# Patient Record
Sex: FEMALE | Race: WHITE | Employment: UNEMPLOYED | URBAN - METROPOLITAN AREA
[De-identification: names, ages, dates, MRNs, and addresses within clinical notes are randomized per-mention and may not be internally consistent; named-entity substitution may affect disease eponyms.]

---

## 2023-01-26 ENCOUNTER — APPOINTMENT (OUTPATIENT)
Dept: ULTRASOUND IMAGING | Age: 28
End: 2023-01-26
Attending: EMERGENCY MEDICINE
Payer: COMMERCIAL

## 2023-01-26 ENCOUNTER — HOSPITAL ENCOUNTER (INPATIENT)
Age: 28
LOS: 1 days | Discharge: HOME | End: 2023-01-27
Attending: STUDENT IN AN ORGANIZED HEALTH CARE EDUCATION/TRAINING PROGRAM | Admitting: INTERNAL MEDICINE
Payer: COMMERCIAL

## 2023-01-26 ENCOUNTER — APPOINTMENT (OUTPATIENT)
Dept: CT IMAGING | Age: 28
End: 2023-01-26
Attending: EMERGENCY MEDICINE
Payer: COMMERCIAL

## 2023-01-26 DIAGNOSIS — R10.9 ACUTE RIGHT FLANK PAIN: ICD-10-CM

## 2023-01-26 DIAGNOSIS — R11.2 INTRACTABLE VOMITING WITH NAUSEA: ICD-10-CM

## 2023-01-26 DIAGNOSIS — N20.0 KIDNEY STONE: ICD-10-CM

## 2023-01-26 DIAGNOSIS — R10.31 RIGHT LOWER QUADRANT ABDOMINAL PAIN: Primary | ICD-10-CM

## 2023-01-26 PROBLEM — N13.8 URINARY TRACT OBSTRUCTION BY KIDNEY STONE: Status: ACTIVE | Noted: 2023-01-26

## 2023-01-26 LAB
ALBUMIN SERPL-MCNC: 3.8 G/DL (ref 3.5–5)
ALBUMIN/GLOB SERPL: 1 (ref 1.1–2.2)
ALP SERPL-CCNC: 80 U/L (ref 45–117)
ALT SERPL-CCNC: 28 U/L (ref 12–78)
ANION GAP SERPL CALC-SCNC: 4 MMOL/L (ref 5–15)
APPEARANCE UR: CLEAR
AST SERPL-CCNC: 14 U/L (ref 15–37)
BACTERIA URNS QL MICRO: NEGATIVE /HPF
BASOPHILS # BLD: 0 K/UL (ref 0–0.1)
BASOPHILS NFR BLD: 0 % (ref 0–1)
BILIRUB SERPL-MCNC: 0.5 MG/DL (ref 0.2–1)
BILIRUB UR QL: NEGATIVE
BUN SERPL-MCNC: 18 MG/DL (ref 6–20)
BUN/CREAT SERPL: 21 (ref 12–20)
CALCIUM SERPL-MCNC: 8.4 MG/DL (ref 8.5–10.1)
CHLORIDE SERPL-SCNC: 115 MMOL/L (ref 97–108)
CO2 SERPL-SCNC: 18 MMOL/L (ref 21–32)
COLOR UR: ABNORMAL
COMMENT, HOLDF: NORMAL
CREAT SERPL-MCNC: 0.85 MG/DL (ref 0.55–1.02)
DIFFERENTIAL METHOD BLD: NORMAL
EOSINOPHIL # BLD: 0.2 K/UL (ref 0–0.4)
EOSINOPHIL NFR BLD: 2 % (ref 0–7)
EPITH CASTS URNS QL MICRO: ABNORMAL /LPF
ERYTHROCYTE [DISTWIDTH] IN BLOOD BY AUTOMATED COUNT: 12.1 % (ref 11.5–14.5)
GLOBULIN SER CALC-MCNC: 4 G/DL (ref 2–4)
GLUCOSE SERPL-MCNC: 103 MG/DL (ref 65–100)
GLUCOSE UR STRIP.AUTO-MCNC: NEGATIVE MG/DL
HCG UR QL: NEGATIVE
HCT VFR BLD AUTO: 40 % (ref 35–47)
HGB BLD-MCNC: 13.4 G/DL (ref 11.5–16)
HGB UR QL STRIP: ABNORMAL
IMM GRANULOCYTES # BLD AUTO: 0 K/UL (ref 0–0.04)
IMM GRANULOCYTES NFR BLD AUTO: 0 % (ref 0–0.5)
KETONES UR QL STRIP.AUTO: NEGATIVE MG/DL
LEUKOCYTE ESTERASE UR QL STRIP.AUTO: ABNORMAL
LIPASE SERPL-CCNC: 132 U/L (ref 73–393)
LYMPHOCYTES # BLD: 2.5 K/UL (ref 0.8–3.5)
LYMPHOCYTES NFR BLD: 41 % (ref 12–49)
MCH RBC QN AUTO: 31 PG (ref 26–34)
MCHC RBC AUTO-ENTMCNC: 33.5 G/DL (ref 30–36.5)
MCV RBC AUTO: 92.6 FL (ref 80–99)
MONOCYTES # BLD: 0.6 K/UL (ref 0–1)
MONOCYTES NFR BLD: 9 % (ref 5–13)
NEUTS SEG # BLD: 2.9 K/UL (ref 1.8–8)
NEUTS SEG NFR BLD: 48 % (ref 32–75)
NITRITE UR QL STRIP.AUTO: NEGATIVE
NRBC # BLD: 0 K/UL (ref 0–0.01)
NRBC BLD-RTO: 0 PER 100 WBC
PH UR STRIP: 6.5 (ref 5–8)
PLATELET # BLD AUTO: 375 K/UL (ref 150–400)
PMV BLD AUTO: 9.2 FL (ref 8.9–12.9)
POTASSIUM SERPL-SCNC: 3.6 MMOL/L (ref 3.5–5.1)
PROT SERPL-MCNC: 7.8 G/DL (ref 6.4–8.2)
PROT UR STRIP-MCNC: NEGATIVE MG/DL
RBC # BLD AUTO: 4.32 M/UL (ref 3.8–5.2)
RBC #/AREA URNS HPF: ABNORMAL /HPF (ref 0–5)
SAMPLES BEING HELD,HOLD: NORMAL
SODIUM SERPL-SCNC: 137 MMOL/L (ref 136–145)
SP GR UR REFRACTOMETRY: 1.01 (ref 1–1.03)
TROPONIN-HIGH SENSITIVITY: 4 NG/L (ref 0–51)
UR CULT HOLD, URHOLD: NORMAL
UROBILINOGEN UR QL STRIP.AUTO: 0.2 EU/DL (ref 0.2–1)
WBC # BLD AUTO: 6.2 K/UL (ref 3.6–11)
WBC URNS QL MICRO: ABNORMAL /HPF (ref 0–4)

## 2023-01-26 PROCEDURE — 96375 TX/PRO/DX INJ NEW DRUG ADDON: CPT

## 2023-01-26 PROCEDURE — 96372 THER/PROPH/DIAG INJ SC/IM: CPT

## 2023-01-26 PROCEDURE — 74176 CT ABD & PELVIS W/O CONTRAST: CPT

## 2023-01-26 PROCEDURE — 65270000032 HC RM SEMIPRIVATE

## 2023-01-26 PROCEDURE — 74011250637 HC RX REV CODE- 250/637: Performed by: INTERNAL MEDICINE

## 2023-01-26 PROCEDURE — G0378 HOSPITAL OBSERVATION PER HR: HCPCS

## 2023-01-26 PROCEDURE — 96376 TX/PRO/DX INJ SAME DRUG ADON: CPT

## 2023-01-26 PROCEDURE — 76856 US EXAM PELVIC COMPLETE: CPT

## 2023-01-26 PROCEDURE — 85025 COMPLETE CBC W/AUTO DIFF WBC: CPT

## 2023-01-26 PROCEDURE — 74011000250 HC RX REV CODE- 250: Performed by: EMERGENCY MEDICINE

## 2023-01-26 PROCEDURE — 76830 TRANSVAGINAL US NON-OB: CPT

## 2023-01-26 PROCEDURE — 74011250636 HC RX REV CODE- 250/636: Performed by: INTERNAL MEDICINE

## 2023-01-26 PROCEDURE — 80053 COMPREHEN METABOLIC PANEL: CPT

## 2023-01-26 PROCEDURE — 81001 URINALYSIS AUTO W/SCOPE: CPT

## 2023-01-26 PROCEDURE — 74011250636 HC RX REV CODE- 250/636: Performed by: NURSE PRACTITIONER

## 2023-01-26 PROCEDURE — 36415 COLL VENOUS BLD VENIPUNCTURE: CPT

## 2023-01-26 PROCEDURE — 99285 EMERGENCY DEPT VISIT HI MDM: CPT

## 2023-01-26 PROCEDURE — 81025 URINE PREGNANCY TEST: CPT

## 2023-01-26 PROCEDURE — 84484 ASSAY OF TROPONIN QUANT: CPT

## 2023-01-26 PROCEDURE — 74011250636 HC RX REV CODE- 250/636: Performed by: EMERGENCY MEDICINE

## 2023-01-26 PROCEDURE — 96361 HYDRATE IV INFUSION ADD-ON: CPT

## 2023-01-26 PROCEDURE — 74011250636 HC RX REV CODE- 250/636: Performed by: STUDENT IN AN ORGANIZED HEALTH CARE EDUCATION/TRAINING PROGRAM

## 2023-01-26 PROCEDURE — 99221 1ST HOSP IP/OBS SF/LOW 40: CPT | Performed by: SURGERY

## 2023-01-26 PROCEDURE — 96374 THER/PROPH/DIAG INJ IV PUSH: CPT

## 2023-01-26 PROCEDURE — 83690 ASSAY OF LIPASE: CPT

## 2023-01-26 RX ORDER — ONDANSETRON 2 MG/ML
4 INJECTION INTRAMUSCULAR; INTRAVENOUS
Status: COMPLETED | OUTPATIENT
Start: 2023-01-26 | End: 2023-01-26

## 2023-01-26 RX ORDER — TAMSULOSIN HYDROCHLORIDE 0.4 MG/1
0.4 CAPSULE ORAL DAILY
Status: DISCONTINUED | OUTPATIENT
Start: 2023-01-26 | End: 2023-01-27 | Stop reason: HOSPADM

## 2023-01-26 RX ORDER — ACETAZOLAMIDE 250 MG/1
250 TABLET ORAL 2 TIMES DAILY
Status: DISCONTINUED | OUTPATIENT
Start: 2023-01-26 | End: 2023-01-27 | Stop reason: HOSPADM

## 2023-01-26 RX ORDER — ACETAMINOPHEN 650 MG/1
650 SUPPOSITORY RECTAL
Status: DISCONTINUED | OUTPATIENT
Start: 2023-01-26 | End: 2023-01-27 | Stop reason: HOSPADM

## 2023-01-26 RX ORDER — MORPHINE SULFATE 2 MG/ML
2 INJECTION, SOLUTION INTRAMUSCULAR; INTRAVENOUS
Status: DISCONTINUED | OUTPATIENT
Start: 2023-01-26 | End: 2023-01-27 | Stop reason: HOSPADM

## 2023-01-26 RX ORDER — KETOROLAC TROMETHAMINE 30 MG/ML
15 INJECTION, SOLUTION INTRAMUSCULAR; INTRAVENOUS EVERY 6 HOURS
Status: DISCONTINUED | OUTPATIENT
Start: 2023-01-26 | End: 2023-01-27 | Stop reason: HOSPADM

## 2023-01-26 RX ORDER — ACETAZOLAMIDE 250 MG/1
250 TABLET ORAL 2 TIMES DAILY
COMMUNITY
Start: 2022-12-30

## 2023-01-26 RX ORDER — PROCHLORPERAZINE EDISYLATE 5 MG/ML
10 INJECTION INTRAMUSCULAR; INTRAVENOUS
Status: COMPLETED | OUTPATIENT
Start: 2023-01-26 | End: 2023-01-26

## 2023-01-26 RX ORDER — HYDROMORPHONE HYDROCHLORIDE 1 MG/ML
1 INJECTION, SOLUTION INTRAMUSCULAR; INTRAVENOUS; SUBCUTANEOUS
Status: DISCONTINUED | OUTPATIENT
Start: 2023-01-26 | End: 2023-01-27 | Stop reason: HOSPADM

## 2023-01-26 RX ORDER — DIPHENHYDRAMINE HYDROCHLORIDE 50 MG/ML
25 INJECTION, SOLUTION INTRAMUSCULAR; INTRAVENOUS
Status: COMPLETED | OUTPATIENT
Start: 2023-01-26 | End: 2023-01-26

## 2023-01-26 RX ORDER — ONDANSETRON 2 MG/ML
4 INJECTION INTRAMUSCULAR; INTRAVENOUS
Status: DISCONTINUED | OUTPATIENT
Start: 2023-01-26 | End: 2023-01-27 | Stop reason: HOSPADM

## 2023-01-26 RX ORDER — AMITRIPTYLINE HYDROCHLORIDE 10 MG/1
10 TABLET, FILM COATED ORAL
COMMUNITY
Start: 2022-12-30

## 2023-01-26 RX ORDER — ACETAMINOPHEN 325 MG/1
650 TABLET ORAL
Status: DISCONTINUED | OUTPATIENT
Start: 2023-01-26 | End: 2023-01-27 | Stop reason: HOSPADM

## 2023-01-26 RX ORDER — MORPHINE SULFATE 2 MG/ML
2 INJECTION, SOLUTION INTRAMUSCULAR; INTRAVENOUS
Status: COMPLETED | OUTPATIENT
Start: 2023-01-26 | End: 2023-01-26

## 2023-01-26 RX ORDER — KETOROLAC TROMETHAMINE 30 MG/ML
15 INJECTION, SOLUTION INTRAMUSCULAR; INTRAVENOUS
Status: COMPLETED | OUTPATIENT
Start: 2023-01-26 | End: 2023-01-26

## 2023-01-26 RX ORDER — ONDANSETRON 4 MG/1
4 TABLET, ORALLY DISINTEGRATING ORAL
Status: DISCONTINUED | OUTPATIENT
Start: 2023-01-26 | End: 2023-01-27 | Stop reason: HOSPADM

## 2023-01-26 RX ORDER — SODIUM CHLORIDE 0.9 % (FLUSH) 0.9 %
5-40 SYRINGE (ML) INJECTION EVERY 8 HOURS
Status: DISCONTINUED | OUTPATIENT
Start: 2023-01-26 | End: 2023-01-27 | Stop reason: HOSPADM

## 2023-01-26 RX ORDER — DICYCLOMINE HYDROCHLORIDE 10 MG/ML
20 INJECTION INTRAMUSCULAR
Status: COMPLETED | OUTPATIENT
Start: 2023-01-26 | End: 2023-01-26

## 2023-01-26 RX ORDER — SODIUM CHLORIDE 0.9 % (FLUSH) 0.9 %
5-40 SYRINGE (ML) INJECTION AS NEEDED
Status: DISCONTINUED | OUTPATIENT
Start: 2023-01-26 | End: 2023-01-27 | Stop reason: HOSPADM

## 2023-01-26 RX ORDER — POLYETHYLENE GLYCOL 3350 17 G/17G
17 POWDER, FOR SOLUTION ORAL DAILY PRN
Status: DISCONTINUED | OUTPATIENT
Start: 2023-01-26 | End: 2023-01-27 | Stop reason: HOSPADM

## 2023-01-26 RX ORDER — SODIUM CHLORIDE, SODIUM LACTATE, POTASSIUM CHLORIDE, CALCIUM CHLORIDE 600; 310; 30; 20 MG/100ML; MG/100ML; MG/100ML; MG/100ML
125 INJECTION, SOLUTION INTRAVENOUS CONTINUOUS
Status: DISCONTINUED | OUTPATIENT
Start: 2023-01-26 | End: 2023-01-27 | Stop reason: HOSPADM

## 2023-01-26 RX ORDER — AMITRIPTYLINE HYDROCHLORIDE 10 MG/1
10 TABLET, FILM COATED ORAL
Status: DISCONTINUED | OUTPATIENT
Start: 2023-01-26 | End: 2023-01-27 | Stop reason: HOSPADM

## 2023-01-26 RX ADMIN — ONDANSETRON HYDROCHLORIDE 4 MG: 2 SOLUTION INTRAMUSCULAR; INTRAVENOUS at 11:51

## 2023-01-26 RX ADMIN — MORPHINE SULFATE 2 MG: 2 INJECTION, SOLUTION INTRAMUSCULAR; INTRAVENOUS at 13:27

## 2023-01-26 RX ADMIN — MORPHINE SULFATE 2 MG: 2 INJECTION, SOLUTION INTRAMUSCULAR; INTRAVENOUS at 15:56

## 2023-01-26 RX ADMIN — FAMOTIDINE 20 MG: 10 INJECTION, SOLUTION INTRAVENOUS at 11:51

## 2023-01-26 RX ADMIN — SODIUM CHLORIDE 1000 ML: 9 INJECTION, SOLUTION INTRAVENOUS at 13:33

## 2023-01-26 RX ADMIN — AMITRIPTYLINE HYDROCHLORIDE 10 MG: 10 TABLET, FILM COATED ORAL at 22:40

## 2023-01-26 RX ADMIN — KETOROLAC TROMETHAMINE 15 MG: 30 INJECTION, SOLUTION INTRAMUSCULAR; INTRAVENOUS at 18:45

## 2023-01-26 RX ADMIN — TAMSULOSIN HYDROCHLORIDE 0.4 MG: 0.4 CAPSULE ORAL at 18:43

## 2023-01-26 RX ADMIN — PROCHLORPERAZINE EDISYLATE 10 MG: 5 INJECTION INTRAMUSCULAR; INTRAVENOUS at 14:16

## 2023-01-26 RX ADMIN — ONDANSETRON HYDROCHLORIDE 4 MG: 2 SOLUTION INTRAMUSCULAR; INTRAVENOUS at 16:00

## 2023-01-26 RX ADMIN — SODIUM CHLORIDE 1000 ML: 9 INJECTION, SOLUTION INTRAVENOUS at 11:50

## 2023-01-26 RX ADMIN — KETOROLAC TROMETHAMINE 15 MG: 30 INJECTION, SOLUTION INTRAMUSCULAR; INTRAVENOUS at 11:51

## 2023-01-26 RX ADMIN — DICYCLOMINE HYDROCHLORIDE 20 MG: 10 INJECTION, SOLUTION INTRAMUSCULAR at 13:27

## 2023-01-26 RX ADMIN — HYDROMORPHONE HYDROCHLORIDE 1 MG: 1 INJECTION, SOLUTION INTRAMUSCULAR; INTRAVENOUS; SUBCUTANEOUS at 18:49

## 2023-01-26 RX ADMIN — DIPHENHYDRAMINE HYDROCHLORIDE 25 MG: 50 INJECTION, SOLUTION INTRAMUSCULAR; INTRAVENOUS at 14:16

## 2023-01-26 RX ADMIN — ACETAZOLAMIDE 250 MG: 250 TABLET ORAL at 18:43

## 2023-01-26 RX ADMIN — SODIUM CHLORIDE 1000 ML: 9 INJECTION, SOLUTION INTRAVENOUS at 15:09

## 2023-01-26 NOTE — ED PROVIDER NOTES
Abdominal Pain   Pertinent negatives include no fever, no diarrhea, no nausea, no vomiting, no dysuria, no headaches, no arthralgias and no chest pain. Patient is a 31-year-old female with past medical history significant for asthma, blood in her urine, kidney stones, pelvic pain, pseudotumor and frequent UTI who presents to the ED with right mid to Right lower abdominal pain that awoke her from a sleep this morning. She states that she felt fine yesterday. She has not had any food or medications today prior to arrival.  She drove her self here. Denies fever, cold symptoms, headache, neck pain, visual changes, focal weakness or rash. Denies any difficulty breathing, difficulty swallowing, SOB or chest pain. Denies any nausea, vomiting or diarrhea. Old charts reviewed.     Past Medical History:   Diagnosis Date    Asthma     Blood in urine     Irregular menstrual cycle     Kidney stone     Nausea & vomiting     Pelvic pain     Pseudotumor     UTI (urinary tract infection)        Past Surgical History:   Procedure Laterality Date    HX ADENOIDECTOMY      HX WISDOM TEETH EXTRACTION           Family History:   Problem Relation Age of Onset    Asthma Mother     Hypertension Mother     Thyroid Disease Mother     Heart Disease Sister     Diabetes Paternal Uncle     Hypertension Paternal Uncle     Diabetes Maternal Grandmother     Hypertension Maternal Grandmother        Social History     Socioeconomic History    Marital status: SINGLE     Spouse name: Not on file    Number of children: Not on file    Years of education: Not on file    Highest education level: Not on file   Occupational History    Not on file   Tobacco Use    Smoking status: Never    Smokeless tobacco: Never   Substance and Sexual Activity    Alcohol use: No    Drug use: Never    Sexual activity: Not on file   Other Topics Concern    Not on file   Social History Narrative    Not on file     Social Determinants of Health     Financial Resource Strain: Not on file   Food Insecurity: Not on file   Transportation Needs: Not on file   Physical Activity: Not on file   Stress: Not on file   Social Connections: Not on file   Intimate Partner Violence: Not on file   Housing Stability: Not on file         ALLERGIES: Amoxicillin-pot clavulanate    Review of Systems   Constitutional:  Negative for activity change, appetite change, fever and unexpected weight change. HENT:  Negative for congestion, rhinorrhea and sore throat. Eyes:  Negative for visual disturbance. Respiratory:  Negative for cough and shortness of breath. Cardiovascular:  Negative for chest pain, palpitations and leg swelling. Gastrointestinal:  Positive for abdominal pain. Negative for diarrhea, nausea and vomiting. Genitourinary:  Negative for dysuria and flank pain. Musculoskeletal:  Negative for arthralgias, gait problem and joint swelling. Skin:  Negative for rash. Neurological:  Negative for dizziness, light-headedness and headaches. All other systems reviewed and are negative. Vitals:    01/26/23 0956   BP: (!) 140/77   Pulse: (!) 105   Resp: 20   Temp: 97.6 °F (36.4 °C)   SpO2: 97%            Physical Exam  Vitals and nursing note reviewed. Constitutional:       General: She is not in acute distress. Appearance: She is well-developed. She is not ill-appearing, toxic-appearing or diaphoretic. 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 Cardiovascular:      Rate and Rhythm: Regular rhythm. Tachycardia present. Pulmonary:      Effort: Pulmonary effort is normal.      Breath sounds: Normal breath sounds. Abdominal:      General: Bowel sounds are normal. There is no distension. There are no signs of injury. Palpations: Abdomen is soft. Tenderness: There is abdominal tenderness in the right upper quadrant, right lower quadrant and periumbilical area. There is no guarding or rebound.  Negative signs include Hudson's sign and McBurney's sign. Hernia: No hernia is present. Skin:     General: Skin is warm and dry. Coloration: Skin is not pale. Findings: No erythema or rash. Neurological:      General: No focal deficit present. Mental Status: She is alert and oriented to person, place, and time. Medical Decision Making  Amount and/or Complexity of Data Reviewed  Labs: ordered. Radiology: ordered. ECG/medicine tests: ordered. Risk  Prescription drug management. Decision regarding hospitalization. Procedures  CT ABD PELV WO CONT    Result Date: 1/26/2023  1. Obstructing right ureterovesical junction calculus (2 x 3 mm). Mild to moderate hydronephrosis. 2. Multiple, additional, small, bilateral, nonobstructing, renal calculi. US TRANSVAGINAL    Result Date: 1/26/2023  No acute process. US PELV NON OBS    Result Date: 1/26/2023  No acute process. Labs Reviewed   URINALYSIS W/MICROSCOPIC - Abnormal; Notable for the following components:       Result Value    Blood MODERATE (*)     Leukocyte Esterase TRACE (*)     All other components within normal limits   METABOLIC PANEL, COMPREHENSIVE - Abnormal; Notable for the following components:    Chloride 115 (*)     CO2 18 (*)     Anion gap 4 (*)     Glucose 103 (*)     BUN/Creatinine ratio 21 (*)     Calcium 8.4 (*)     AST (SGOT) 14 (*)     A-G Ratio 1.0 (*)     All other components within normal limits   URINE CULTURE HOLD SAMPLE   CBC WITH AUTOMATED DIFF   LIPASE   SAMPLES BEING HELD   HCG URINE, QL. - POC     Dr. Maryjo Lozano (surgery) came to the ED to evaluate the pt. [address] [facility] urology came to evaluate the pt Carrie Christianson NP/Dr. Hopson Screen) ; they want the hospitalist to admit for stent placement tomorrow. Perfect Serve Consult for Admission  4:21 PM    ED Room Number: R37/R37  Patient Name and age:  Belén Rasmussen 24 y.o.  female  Working Diagnosis:   1. Right lower quadrant abdominal pain    2.  Intractable vomiting with nausea    3. Acute right flank pain    4. Kidney stone        COVID-19 Suspicion:  no  Sepsis present:  no  Reassessment needed: no  Code Status:  Full Code  Readmission: no  Isolation Requirements:  no  Recommended Level of Care:  med/surg  Department:[facility] Adult ED - 21   Other:       6:43 PM  Patient's results and plan of care have been reviewed with the pt and her family member present. Patient and/or family have verbally conveyed their understanding and agreement of the patient's signs, symptoms, diagnosis, treatment and prognosis and additionally agree to be admitted. Francine Teixeira NP  Discussed plan of care with Dr. Jakub Amor. Francine Teixeira NP

## 2023-01-26 NOTE — ED TRIAGE NOTES
Pt c/o right sided abd pain that started today, +nausea, some diarrhea, some dysuria, denies pregnancy, denies constipation , pt tearful in triage

## 2023-01-26 NOTE — H&P
[address] [facility] Adult  Hospitalist Group  History and Physical    Date of Service:  1/26/2023  Primary Care Provider: Darya Craig MD  Source of information: The patient and Chart review    Chief Complaint: Abdominal Pain      History of Presenting Illness:   Dee Calderón is a 24 y.o. female with h/o asthma, kidney stones, pseudotumor cerebri and endometriosis who presented d/t sudden RLQ abd pain that awoke her from sleep this morning. A/w N/V today. Initially pain was 10/10, now 3/10 after IV Morphine here. No h/o obstructing stones or need for intervention. Pseudotumor is well controlled on home meds Diamox and Elavil. She has a h/o asthma when she was younger; no recent issues. The patient denies any headache, blurry vision, sore throat, trouble swallowing, trouble with speech, chest pain, SOB, cough, fever, chills, constipation, recent travels, sick contacts, focal or generalized neurological symptoms, falls, injuries, rashes, contact with COVID-19 diagnosed patients, hematemesis, melena, hemoptysis, hematuria, rashes, denies starting any new medications and denies any other concerns or problems besides as mentioned above. REVIEW OF SYSTEMS:  A comprehensive review of systems was negative except for that written in the History of Present Illness. Past Medical History:   Diagnosis Date    Asthma     Blood in urine     Irregular menstrual cycle     Kidney stone     Nausea & vomiting     Pelvic pain     Pseudotumor     UTI (urinary tract infection)       Past Surgical History:   Procedure Laterality Date    HX ADENOIDECTOMY      HX WISDOM TEETH EXTRACTION       Prior to Admission medications    Medication Sig Start Date End Date Taking? Authorizing Provider   acetaZOLAMIDE (DIAMOX) 250 mg tablet Take 250 mg by mouth two (2) times a day. 12/30/22   Provider, Historical   amitriptyline (ELAVIL) 10 mg tablet Take 10 mg by mouth nightly.  12/30/22   Provider, Historical     Allergies Allergen Reactions    Amoxicillin-Pot Clavulanate Other (comments)      Family History   Problem Relation Age of Onset    Asthma Mother     Hypertension Mother     Thyroid Disease Mother     Heart Disease Sister     Diabetes Paternal Uncle     Hypertension Paternal Uncle     Diabetes Maternal Grandmother     Hypertension Maternal Grandmother       Social History:  reports that she has never smoked. She has never used smokeless tobacco. She reports that she does not drink alcohol and does not use drugs. Family and social history were personally reviewed, all pertinent and relevant details are outlined as above. Objective:   Visit Vitals  BP (!) 140/77   Pulse (!) 105   Temp 97.6 °F (36.4 °C)   Resp 20   SpO2 97%      O2 Device: None (Room air)    PHYSICAL EXAM:   General:          Alert, cooperative, no distress  Head:    Normocephalic, atraumatic  Eyes:   Conjunctivae clear, no scleral icterus, EOMs intact  ENT:   Mucosa normal. No drainage  Neck:               Supple, symmetrical, trachea midline  Back:               Symmetric, ROM normal. R CVA tenderness  Lungs:             Clear to auscultation bilaterally, symmetric expansion, no respiratory distress  Chest wall:      No tenderness or deformity  Heart:              Regular rate and rhythm, no audible murmur  Abdomen:        Soft, non-distended, tender RLQ  Extremities:     Extremities normal, atraumatic, no cyanosis or edema  Skin:                Warm, dry, turgor normal, no rashes or lesions  Neurologic:      CNII-XII grossly intact. Sensory grossly within normal limits. A&O. HAMLIN    Data Review: All diagnostic labs and studies have been reviewed.     Abnormal Labs Reviewed   URINALYSIS W/MICROSCOPIC - Abnormal; Notable for the following components:       Result Value    Blood MODERATE (*)     Leukocyte Esterase TRACE (*)     All other components within normal limits   METABOLIC PANEL, COMPREHENSIVE - Abnormal; Notable for the following components: Chloride 115 (*)     CO2 18 (*)     Anion gap 4 (*)     Glucose 103 (*)     BUN/Creatinine ratio 21 (*)     Calcium 8.4 (*)     AST (SGOT) 14 (*)     A-G Ratio 1.0 (*)     All other components within normal limits       All Micro Results       Procedure Component Value Units Date/Time    URINE CULTURE HOLD SAMPLE [[id number]] Collected: 01/26/23 1016    Order Status: Completed Specimen: Urine Updated: 01/26/23 1041     Urine culture hold       Urine on hold in Microbiology dept for 2 days. If unpreserved urine is submitted, it cannot be used for addtional testing after 24 hours, recollection will be required. IMAGING:   CT ABD PELV WO CONT   Final Result   1. Obstructing right ureterovesical junction calculus (2 x 3 mm). Mild to   moderate hydronephrosis. 2. Multiple, additional, small, bilateral, nonobstructing, renal calculi. US TRANSVAGINAL   Final Result   No acute process. US PELV NON OBS   Final Result   No acute process. ECG/ECHO:  No results found for this or any previous visit. External records were reviewed    Assessment:   Given the patient's current clinical presentation, there is a high level of concern for decompensation if discharged from the emergency department. Complex decision making was performed, which includes reviewing the patient's available past medical records, laboratory results, and imaging studies.     Active Problems:    Urinary tract obstruction by kidney stone (1/26/2023)    Plan:     Obstructing R ureteral stone with hydronephrosis  RLQ and flank pain  H/o non-obstructive kidney stones  Likely 2/2 Diamox  Urology consult today: IV hydration, strain urine, poss stent placement tomorrow  Start flomax  Nausea meds, pain control (Toradol, Morphine)  TVUS and pelvic US nothing acute    Pseudotumor cerebri  No acute issue  Cont home meds (recommend d/w neurologist if can reduce or dc Diamox d/t recurrent kidney stones)    Remote h/o asthma, no acute issue    Chronic illnesses impacting care:  pseudotumor    Reviewed current home medications with patient and will continue same and make no changes    Care is not affected by social determinants of health: N/A      DIET: DIET NPO  ADULT DIET Clear Liquid   ISOLATION PRECAUTIONS: There are currently no Active Isolations  CODE STATUS: Full Code  DVT PROPHYLAXIS: SCDs  FUNCTIONAL STATUS PRIOR TO HOSPITALIZATION: Fully active and ambulatory; able to carry on all self-care without restriction.   EARLY MOBILITY ASSESSMENT: Recommend routine ambulation while hospitalized with the assistance of nursing staff  Care Plan discussed with: Patient/Family and Consultant urology  Anticipated Disposition: Home w/Family  Anticipated Discharge: 24 hours to 48 hours    CRITICAL CARE WAS PERFORMED FOR THIS ENCOUNTER: NO.    Signed By: Bri Montes MD     January 26, 2023

## 2023-01-26 NOTE — CONSULTS
[facility] General Surgery Consultation for: abdominal pain    Requesting Physician: Luisa Schwab    History of Present Illness:      Dee Calderón is a 24 y.o. female who has a history of kidney stones and started having abdominal pain starting today. The pain appears to be more on the right side to right lower quadrant area. The pain is relatively severe a 5 out of 10. She has had some nausea but no vomiting. No changes in bowel movements. The pain appears to be constant without much radiation. Past Medical History:   Diagnosis Date    Asthma     Blood in urine     Irregular menstrual cycle     Kidney stone     Nausea & vomiting     Pelvic pain     Pseudotumor     UTI (urinary tract infection)        Past Surgical History:   Procedure Laterality Date    HX ADENOIDECTOMY      HX WISDOM TEETH EXTRACTION           Current Facility-Administered Medications:     sodium chloride 0.9 % bolus infusion 1,000 mL, 1,000 mL, IntraVENous, ONCE, Екатерина Nunn NP, Last Rate: 1,000 mL/hr at 01/26/23 1333, 1,000 mL at 01/26/23 1333    Current Outpatient Medications:     doxycycline (ADOXA) 100 mg tablet, Take 100 mg by mouth daily. , Disp: , Rfl:     benzoyl peroxide 5 % external liquid, USE AS A WASH ON FACE BID, Disp: , Rfl:     tazorotene (TAZORAC) 0.05 % topical cream, Apply  to affected area nightly.  use thin film, Disp: , Rfl:     albuterol (PROVENTIL HFA, VENTOLIN HFA, PROAIR HFA) 90 mcg/actuation inhaler, Take 2 Puffs by inhalation every six (6) hours as needed for Wheezing., Disp: , Rfl:     ondansetron (ZOFRAN ODT) 4 mg disintegrating tablet, DISSOLVE 1 TABLET ON TONGUE EVERY 6 HOURS FOR NAUSEA OR VOMITING, Disp: , Rfl: 0    Allergies   Allergen Reactions    Amoxicillin-Pot Clavulanate Other (comments)       Social History     Socioeconomic History    Marital status: SINGLE     Spouse name: Not on file    Number of children: Not on file    Years of education: Not on file    Highest education level: Not on file Occupational History    Not on file   Tobacco Use    Smoking status: Never    Smokeless tobacco: Never   Substance and Sexual Activity    Alcohol use: No    Drug use: Never    Sexual activity: Not on file   Other Topics Concern    Not on file   Social History Narrative    Not on file     Social Determinants of Health     Financial Resource Strain: Not on file   Food Insecurity: Not on file   Transportation Needs: Not on file   Physical Activity: Not on file   Stress: Not on file   Social Connections: Not on file   Intimate Partner Violence: Not on file   Housing Stability: Not on file       Family History   Problem Relation Age of Onset    Asthma Mother     Hypertension Mother     Thyroid Disease Mother     Heart Disease Sister     Diabetes Paternal Uncle     Hypertension Paternal Uncle     Diabetes Maternal Grandmother     Hypertension Maternal Grandmother        ROS   Constitutional: negative  Ears, Nose, Mouth, Throat, and Face: negative  Respiratory: negative  Cardiovascular: negative  Gastrointestinal: positive for abdominal pain  Genitourinary:negative  Integument/Breast: negative  Hematologic/Lymphatic: negative  Behavioral/Psychiatric: negative  Allergic/Immunologic: negative      Physical Exam:     Visit Vitals  BP (!) 140/77   Pulse (!) 105   Temp 97.6 °F (36.4 °C)   Resp 20   SpO2 97%       General - alert and oriented, no apparent distress, well developed  HEENT - no jaundice, no hearing imparement  Pulm - CTAB, no C/W/R  CV - RRR, no M/R/G  Abd -soft, nondistended, bowel sounds present, tenderness to palpation on the right side and right lower quadrant, no guarding no rebound  Ext - pulses intact in UE and LE bilaterally, no edema  Skin - supple and no rashes  Psychiatric - normal affect, good mood    Labs  Lab Results   Component Value Date/Time    Sodium 137 01/26/2023 10:34 AM    Potassium 3.6 01/26/2023 10:34 AM    Chloride 115 (H) 01/26/2023 10:34 AM    CO2 18 (L) 01/26/2023 10:34 AM    Anion gap 4 (L) 01/26/2023 10:34 AM    Glucose 103 (H) 01/26/2023 10:34 AM    BUN 18 01/26/2023 10:34 AM    Creatinine 0.85 01/26/2023 10:34 AM    BUN/Creatinine ratio 21 (H) 01/26/2023 10:34 AM    Calcium 8.4 (L) 01/26/2023 10:34 AM    Bilirubin, total 0.5 01/26/2023 10:34 AM    Alk. phosphatase 80 01/26/2023 10:34 AM    Protein, total 7.8 01/26/2023 10:34 AM    Albumin 3.8 01/26/2023 10:34 AM    Globulin 4.0 01/26/2023 10:34 AM    A-G Ratio 1.0 (L) 01/26/2023 10:34 AM    ALT (SGPT) 28 01/26/2023 10:34 AM    AST (SGOT) 14 (L) 01/26/2023 10:34 AM     Lab Results   Component Value Date/Time    WBC 6.2 01/26/2023 10:34 AM    HGB 13.4 01/26/2023 10:34 AM    HCT 40.0 01/26/2023 10:34 AM    PLATELET 716 97/53/2766 10:34 AM    MCV 92.6 01/26/2023 10:34 AM         Imaging  CT abdomen pelvis-FINDINGS:  Abdomen: A 2 x 3 mm calculus in the right ureterovesical junction causes mild  ureterectasis, mild to moderate hydronephrosis, and renal edema. Multiple,  additional, small, bilateral renal calculi are nonobstructing. The lung bases are clear. The heart is at the upper limits of normal in size. The unenhanced distal esophagus, stomach, duodenum, liver, gallbladder,  pancreas, spleen, and adrenals are normal.     Pelvis: The unenhanced small bowel, ileocecal junction, appendix, colon, and  bladder are normal. No free air or fluid, and no abdominopelvic lymphadenopathy. IMPRESSION  1. Obstructing right ureterovesical junction calculus (2 x 3 mm). Mild to  moderate hydronephrosis. 2. Multiple, additional, small, bilateral, nonobstructing, renal calculi. I have personally reviewed all of the pertinent images     Assessment:     Kamala Erickson is a 24 y.o. female with right-sided nephrolithiasis    Recommendations:     1. On CT scan there is no indication of any appendicitis. The appendix appears to be normal.  She has normal white blood cell count with no left shift.   I believe her abdominal pain is most likely related to the right-sided nephrolithiasis. I discussed this with the emergency room physician. At this point no need for any surgical intervention.   I will sign off at this point thank you very much for the consultation    Angeles Song MD    Greater than half of the time: 55 minutes was used in counciling the patient about diagnosis and treatment plan

## 2023-01-26 NOTE — CONSULTS
New Urology Consult Note    Patient: Yossi Boothe MRN: [id number]  SSN: [id number]    YOB: 1995  Age: 24 y.o. Sex: female            Assessment:     R ureteral stone   Intractable pain   Hx ovarian cyst     Recommendations:     1. 2x3 mm obstructing stone   - recc admission for pain management   NPO after MN for possible stent placement   - manage pain     Thank you for this consult. Please contact [facility] with any further questions/concerns. History of Present Illness:     Reason for Consult:  ureteral stone     Yossi Boothe is seen in consultation for reasons noted above at the request of Emma Fry MD.    This is a 24 y.o. female with a history of asthma, blood in her urine, kidney stones, pelvic pain, pseudotumor and frequent UTI who presents to the ED with right mid to Right lower abdominal pain that awoke her from a sleep this morning. In the ER CT was done which showed obstructing right UVJ stone with mild to moderate hydronephrosis  creatinine 0.85  WBC 6.2 Vaginal ultrasound was also done for history of ovarian cysts no acute process was noted. Urology has been consulted for obstructing ureteral stone . Patient is not known to [facility] she does not have a primary urologist.  She states history of kidney stones last 1 being several years ago. She denies any previous intervention for her stones. She also states a family history of kidney stones. Discussed with patient CT findings of obstructing stone. Patient states some nausea. She notes the pain as right lower quadrant however radiates to the right flank area states her appetite has been poor she does states chills at home but no fever. No fever was demonstrated on admission. Discussed possible admission overnight with pain management possible ureteral stent placement if pain is not well managed.   Patient agrees to overnight observation with IV hydration straining of urine and pain management. She is aware that if no improvement she will need a ureteral stent placed      Subjective     Past Medical History  Past Medical History:   Diagnosis Date    Asthma     Blood in urine     Irregular menstrual cycle     Kidney stone     Nausea & vomiting     Pelvic pain     Pseudotumor     UTI (urinary tract infection)        Past Surgical History:   Past Surgical History:   Procedure Laterality Date    HX ADENOIDECTOMY      HX WISDOM TEETH EXTRACTION         Medication:  Current Facility-Administered Medications   Medication Dose Route Frequency Provider Last Rate Last Admin    sodium chloride 0.9 % bolus infusion 1,000 mL  1,000 mL IntraVENous ONCE Екатерина Nunn NP        morphine injection 2 mg  2 mg IntraVENous NOW Екатерина Nunn NP         Current Outpatient Medications   Medication Sig Dispense Refill    doxycycline (ADOXA) 100 mg tablet Take 100 mg by mouth daily. benzoyl peroxide 5 % external liquid USE AS A WASH ON FACE BID      tazorotene (TAZORAC) 0.05 % topical cream Apply  to affected area nightly. use thin film      albuterol (PROVENTIL HFA, VENTOLIN HFA, PROAIR HFA) 90 mcg/actuation inhaler Take 2 Puffs by inhalation every six (6) hours as needed for Wheezing. ondansetron (ZOFRAN ODT) 4 mg disintegrating tablet DISSOLVE 1 TABLET ON TONGUE EVERY 6 HOURS FOR NAUSEA OR VOMITING  0       Allergies:   Allergies   Allergen Reactions    Amoxicillin-Pot Clavulanate Other (comments)       Social History:  Social History     Tobacco Use    Smoking status: Never    Smokeless tobacco: Never   Substance Use Topics    Alcohol use: No    Drug use: Never       Family History  Family History   Problem Relation Age of Onset    Asthma Mother     Hypertension Mother     Thyroid Disease Mother     Heart Disease Sister     Diabetes Paternal Uncle     Hypertension Paternal Uncle     Diabetes Maternal Grandmother     Hypertension Maternal Grandmother        Review of Systems  ROS from attending provider note from H&P reviewed and changes (other than per HPI) include : none. Objective:     Vital signs in last 24 hours:  Visit Vitals  BP (!) 140/77   Pulse (!) 105   Temp 97.6 °F (36.4 °C)   Resp 20   SpO2 97%       Intake/Output last 3 shifts:        Physical Exam  General: NAD, A&O,   HEENT: lids normal, no tracheal deviation, no lesions or deformities  Pulmonary: Normal work of breathing   Abdomen: soft, mildly tender RLQ nondistended, no suprapubic fullness or tenderness  : voiding , R CVA tenderness  Gait: not observed  Neuro: Appropriate, no focal neurological deficits  Mood/Affect: normal    Lab/Imaging Review:       Most Recent Labs:  Lab Results   Component Value Date/Time    WBC 6.2 01/26/2023 10:34 AM    HGB 13.4 01/26/2023 10:34 AM    HCT 40.0 01/26/2023 10:34 AM    PLATELET 774 22/07/9146 10:34 AM    MCV 92.6 01/26/2023 10:34 AM        Lab Results   Component Value Date/Time    Sodium 137 01/26/2023 10:34 AM    Potassium 3.6 01/26/2023 10:34 AM    Chloride 115 (H) 01/26/2023 10:34 AM    CO2 18 (L) 01/26/2023 10:34 AM    Anion gap 4 (L) 01/26/2023 10:34 AM    Glucose 103 (H) 01/26/2023 10:34 AM    BUN 18 01/26/2023 10:34 AM    Creatinine 0.85 01/26/2023 10:34 AM    BUN/Creatinine ratio 21 (H) 01/26/2023 10:34 AM    Calcium 8.4 (L) 01/26/2023 10:34 AM    Bilirubin, total 0.5 01/26/2023 10:34 AM    Alk.  phosphatase 80 01/26/2023 10:34 AM    Protein, total 7.8 01/26/2023 10:34 AM    Albumin 3.8 01/26/2023 10:34 AM    Globulin 4.0 01/26/2023 10:34 AM    A-G Ratio 1.0 (L) 01/26/2023 10:34 AM    ALT (SGPT) 28 01/26/2023 10:34 AM        No results found for: PSA, Wilmon Noss, PSAR3, [id number], [id number], 68352, PSAEXT     COAGS:  No results found for: APTT, PTP, INR, INREXT    Lab Results   Component Value Date/Time    Hemoglobin A1c 4.9 04/04/2022 09:25 AM        No results found for: CPK, RCK1, RCK2, RCK3, RCK4, CKMB, CKNDX, CKND1, TROPT, TROIQ, BNPP, BNP       Urine/Blood Cultures:  Results       Procedure Component Value Units Date/Time    URINE CULTURE HOLD SAMPLE [[id number]] Collected: 01/26/23 1016    Order Status: Completed Specimen: Urine Updated: 01/26/23 1041     Urine culture hold       Urine on hold in Microbiology dept for 2 days. If unpreserved urine is submitted, it cannot be used for addtional testing after 24 hours, recollection will be required. IMAGING:  CT ABD PELV WO CONT    Result Date: 1/26/2023  CT ABDOMEN AND PELVIS WITHOUT CONTRAST. 1/26/2023 12:36 PM INDICATION: Right flank pain. COMPARISON: None. TECHNIQUE: CT of the abdomen and pelvis was performed without contrast. CT dose reduction was achieved through use of a standardized protocol tailored for this examination and automatic exposure control for dose modulation. FINDINGS: Abdomen: A 2 x 3 mm calculus in the right ureterovesical junction causes mild ureterectasis, mild to moderate hydronephrosis, and renal edema. Multiple, additional, small, bilateral renal calculi are nonobstructing. The lung bases are clear. The heart is at the upper limits of normal in size. The unenhanced distal esophagus, stomach, duodenum, liver, gallbladder, pancreas, spleen, and adrenals are normal. Pelvis: The unenhanced small bowel, ileocecal junction, appendix, colon, and bladder are normal. No free air or fluid, and no abdominopelvic lymphadenopathy. 1. Obstructing right ureterovesical junction calculus (2 x 3 mm). Mild to moderate hydronephrosis. 2. Multiple, additional, small, bilateral, nonobstructing, renal calculi. US TRANSVAGINAL    Result Date: 1/26/2023  TRANSABDOMINAL AND TRANSVAGINAL PELVIC ULTRASOUND WITH PELVIC DOPPLER. 1/26/2023 11:16 AM INDICATION: Right lower quadrant abdominal pain. COMPARISON: None. TECHNIQUE: Grayscale, color, and Doppler ultrasound imaging of the pelvis was performed both transabdominally and transvaginally.  FINDINGS: Transabdominally, the pelvic organs are obscured by bowel gas. Transvaginally, the uterus measures 71 x 42 x 36 mm and shows no focal myometrial abnormality. The endometrium measures 9 mm and is of normal thickness without focal abnormality. There is a probable hemorrhagic cyst in the right ovary. The left ovary is grossly normal.  Color flow to the ovaries does not exclude torsion, however, the likelihood is very low given the greyscale appearance. The right ovary measures 43 x 38 x 31 mm and the left ovary measures 33 x 20 x 19 mm. Trace free pelvic fluid is likely physiologic. No acute process. US PELV NON OBS    Result Date: 1/26/2023  TRANSABDOMINAL AND TRANSVAGINAL PELVIC ULTRASOUND WITH PELVIC DOPPLER. 1/26/2023 11:16 AM INDICATION: Right lower quadrant abdominal pain. COMPARISON: None. TECHNIQUE: Grayscale, color, and Doppler ultrasound imaging of the pelvis was performed both transabdominally and transvaginally. FINDINGS: Transabdominally, the pelvic organs are obscured by bowel gas. Transvaginally, the uterus measures 71 x 42 x 36 mm and shows no focal myometrial abnormality. The endometrium measures 9 mm and is of normal thickness without focal abnormality. There is a probable hemorrhagic cyst in the right ovary. The left ovary is grossly normal.  Color flow to the ovaries does not exclude torsion, however, the likelihood is very low given the greyscale appearance. The right ovary measures 43 x 38 x 31 mm and the left ovary measures 33 x 20 x 19 mm. Trace free pelvic fluid is likely physiologic. No acute process. D/w Dr Mary Kovacs By: [place].  Disha Vázquez NP  - January 26, 2023

## 2023-01-27 VITALS
SYSTOLIC BLOOD PRESSURE: 104 MMHG | RESPIRATION RATE: 17 BRPM | HEART RATE: 78 BPM | DIASTOLIC BLOOD PRESSURE: 69 MMHG | TEMPERATURE: 98.7 F | OXYGEN SATURATION: 97 %

## 2023-01-27 LAB
ANION GAP SERPL CALC-SCNC: 8 MMOL/L (ref 5–15)
BUN SERPL-MCNC: 14 MG/DL (ref 6–20)
BUN/CREAT SERPL: 14 (ref 12–20)
CALCIUM SERPL-MCNC: 7.5 MG/DL (ref 8.5–10.1)
CHLORIDE SERPL-SCNC: 117 MMOL/L (ref 97–108)
CO2 SERPL-SCNC: 18 MMOL/L (ref 21–32)
COMMENT, HOLDF: NORMAL
CREAT SERPL-MCNC: 0.99 MG/DL (ref 0.55–1.02)
GLUCOSE SERPL-MCNC: 81 MG/DL (ref 65–100)
MAGNESIUM SERPL-MCNC: 1.9 MG/DL (ref 1.6–2.4)
PHOSPHATE SERPL-MCNC: 2.3 MG/DL (ref 2.6–4.7)
POTASSIUM SERPL-SCNC: 3.5 MMOL/L (ref 3.5–5.1)
SAMPLES BEING HELD,HOLD: NORMAL
SODIUM SERPL-SCNC: 143 MMOL/L (ref 136–145)

## 2023-01-27 PROCEDURE — 96376 TX/PRO/DX INJ SAME DRUG ADON: CPT

## 2023-01-27 PROCEDURE — 83735 ASSAY OF MAGNESIUM: CPT

## 2023-01-27 PROCEDURE — 74011000250 HC RX REV CODE- 250: Performed by: INTERNAL MEDICINE

## 2023-01-27 PROCEDURE — 80048 BASIC METABOLIC PNL TOTAL CA: CPT

## 2023-01-27 PROCEDURE — 74011250637 HC RX REV CODE- 250/637: Performed by: INTERNAL MEDICINE

## 2023-01-27 PROCEDURE — 84100 ASSAY OF PHOSPHORUS: CPT

## 2023-01-27 PROCEDURE — 36415 COLL VENOUS BLD VENIPUNCTURE: CPT

## 2023-01-27 PROCEDURE — G0378 HOSPITAL OBSERVATION PER HR: HCPCS

## 2023-01-27 PROCEDURE — 74011250636 HC RX REV CODE- 250/636: Performed by: INTERNAL MEDICINE

## 2023-01-27 RX ORDER — ONDANSETRON 4 MG/1
4 TABLET, ORALLY DISINTEGRATING ORAL
Qty: 6 TABLET | Refills: 0 | Status: SHIPPED | OUTPATIENT
Start: 2023-01-27

## 2023-01-27 RX ORDER — IBUPROFEN 600 MG/1
600 TABLET ORAL
Qty: 20 TABLET | Refills: 0 | Status: SHIPPED | OUTPATIENT
Start: 2023-01-27 | End: 2023-01-27 | Stop reason: SDUPTHER

## 2023-01-27 RX ORDER — TAMSULOSIN HYDROCHLORIDE 0.4 MG/1
0.4 CAPSULE ORAL DAILY
Qty: 30 CAPSULE | Refills: 0 | Status: SHIPPED | OUTPATIENT
Start: 2023-01-28

## 2023-01-27 RX ORDER — ONDANSETRON 4 MG/1
4 TABLET, ORALLY DISINTEGRATING ORAL
Qty: 6 TABLET | Refills: 0 | Status: SHIPPED | OUTPATIENT
Start: 2023-01-27 | End: 2023-01-27 | Stop reason: SDUPTHER

## 2023-01-27 RX ORDER — TAMSULOSIN HYDROCHLORIDE 0.4 MG/1
0.4 CAPSULE ORAL DAILY
Qty: 30 CAPSULE | Refills: 0 | Status: SHIPPED | OUTPATIENT
Start: 2023-01-28 | End: 2023-01-27 | Stop reason: SDUPTHER

## 2023-01-27 RX ORDER — IBUPROFEN 600 MG/1
600 TABLET ORAL
Qty: 20 TABLET | Refills: 0 | Status: SHIPPED | OUTPATIENT
Start: 2023-01-27

## 2023-01-27 RX ADMIN — TAMSULOSIN HYDROCHLORIDE 0.4 MG: 0.4 CAPSULE ORAL at 08:54

## 2023-01-27 RX ADMIN — ACETAZOLAMIDE 250 MG: 250 TABLET ORAL at 08:54

## 2023-01-27 RX ADMIN — ONDANSETRON 4 MG: 2 INJECTION INTRAMUSCULAR; INTRAVENOUS at 00:48

## 2023-01-27 RX ADMIN — SODIUM CHLORIDE, PRESERVATIVE FREE 10 ML: 5 INJECTION INTRAVENOUS at 08:54

## 2023-01-27 RX ADMIN — KETOROLAC TROMETHAMINE 15 MG: 30 INJECTION, SOLUTION INTRAMUSCULAR; INTRAVENOUS at 12:52

## 2023-01-27 RX ADMIN — KETOROLAC TROMETHAMINE 15 MG: 30 INJECTION, SOLUTION INTRAMUSCULAR; INTRAVENOUS at 00:48

## 2023-01-27 RX ADMIN — KETOROLAC TROMETHAMINE 15 MG: 30 INJECTION, SOLUTION INTRAMUSCULAR; INTRAVENOUS at 07:37

## 2023-01-27 NOTE — PROGRESS NOTES
I have reviewed discharge instructions with the patient. The patient verbalized understanding. Patient to transport home via private vehicle. No home medical needs. Prescriptions sent to [facility] on [facility].

## 2023-01-27 NOTE — DISCHARGE SUMMARY
Physician Discharge Summary     Patient ID:    Ev De Oliveira  [id number]  50 y.o.  1995    Admit date: 1/26/2023    Discharge date and time: 1/27/2023 10:09 AM    Admission HPI:  Ev De Oliveira is a 24 y.o. female with h/o asthma, kidney stones, pseudotumor cerebri and endometriosis who presented d/t sudden RLQ abd pain that awoke her from sleep this morning. A/w N/V today. Initially pain was 10/10, now 3/10 after IV Morphine here. No h/o obstructing stones or need for intervention. Pseudotumor is well controlled on home meds Diamox and Elavil. She has a h/o asthma when she was younger; no recent issues      Hospital Diagnoses and Treatment Rendered:   Obstructing R ureteral stone with hydronephrosis  RLQ and flank pain - resolved  H/o non-obstructive kidney stones  Likely 2/2 Diamox  S/p IV hydration  Flomax started, will continue  TVUS and pelvic US nothing acute  Urology tentatively had planned for stent placement today but since pain resolved, recommend dc home with OP f/up with [facility], provide urine strainer to take home     Pseudotumor cerebri  No acute issue  Cont home meds (recommend d/w neurologist if can reduce or dc Diamox d/t recurrent kidney stones)     Remote h/o asthma, no acute issue        Chronic Diagnoses:    Problem List as of 1/27/2023 Date Reviewed: 3/12/2017            Codes Class Noted - Resolved    Urinary tract obstruction by kidney stone ICD-10-CM: N20.0, N13.8  ICD-9-CM: 592.0, 599.69  1/26/2023 - Present           Discharge Medications:   Current Discharge Medication List        START taking these medications    Details   tamsulosin (FLOMAX) 0.4 mg capsule Take 1 Capsule by mouth daily. Indications: stones in the urinary tract  Qty: 30 Capsule, Refills: 0  Start date: 1/28/2023      ibuprofen (MOTRIN) 600 mg tablet Take 1 Tablet by mouth every six (6) hours as needed for Pain.  Indications: pain  Qty: 20 Tablet, Refills: 0  Start date: 1/27/2023      ondansetron (ZOFRAN ODT) 4 mg disintegrating tablet Take 1 Tablet by mouth every six (6) hours as needed for Nausea or Vomiting. Qty: 6 Tablet, Refills: 0  Start date: 1/27/2023           CONTINUE these medications which have NOT CHANGED    Details   acetaZOLAMIDE (DIAMOX) 250 mg tablet Take 250 mg by mouth two (2) times a day. amitriptyline (ELAVIL) 10 mg tablet Take 10 mg by mouth nightly. Follow up Care:    Darlene Salazar MD in 1-2 weeks. Please call to set up an appointment shortly after discharge. Follow-up Information       Follow up With Specialties Details Why [address] [facility] Go on 1/31/2023 2:10 PM with Dr. Kesha Gordon / Jey Gonsalves  [address]    Darlene Salazar MD Pediatric Medicine   [address] [place] [address]  [phone]              Diet: ADULT DIET Regular    Disposition:  Home    Code Status: Full Code    Discharge Exam:  General:  Alert, cooperative, no distress  Back:    Symmetric, ROM normal. No CVA tenderness. Lungs:   Clear to auscultation bilaterally, symmetric expansion, no respiratory distress  Chest wall:  No tenderness or deformity  Heart:   Regular rate and rhythm, no audible murmur  Abdomen:   Soft, non-tender, non-distended  Extremities: Extremities normal, atraumatic, no cyanosis or edema  Skin:  Skin color, texture, turgor normal. No rashes or lesions  Neurologic: CNII-XII intact. HAMLIN. A&O    CONSULTATIONS: IP CONSULT TO UROLOGY    Significant Diagnostic Studies:   1/26/2023: BUN 18 MG/DL (Ref range: 6 - 20 MG/DL); Calcium 8.4 MG/DL (L; Ref range: 8.5 - 10.1 MG/DL); CO2 18 mmol/L (L; Ref range: 21 - 32 mmol/L); Creatinine 0.85 MG/DL (Ref range: 0.55 - 1.02 MG/DL); Glucose 103 mg/dL (H; Ref range: 65 - 100 mg/dL); HCT 40.0 % (Ref range: 35.0 - 47.0 %); HGB 13.4 g/dL (Ref range: 11.5 - 16.0 g/dL);  Potassium 3.6 mmol/L (Ref range: 3.5 - 5.1 mmol/L); Sodium 137 mmol/L (Ref range: 136 - 145 mmol/L)  1/27/2023: BUN 14 MG/DL (Ref range: 6 - 20 MG/DL); Calcium 7.5 MG/DL (L; Ref range: 8.5 - 10.1 MG/DL); CO2 18 mmol/L (L; Ref range: 21 - 32 mmol/L); Creatinine 0.99 MG/DL (Ref range: 0.55 - 1.02 MG/DL); Glucose 81 mg/dL (Ref range: 65 - 100 mg/dL); Potassium 3.5 mmol/L (Ref range: 3.5 - 5.1 mmol/L); Sodium 143 mmol/L (Ref range: 136 - 145 mmol/L)  Recent Labs     01/26/23  1034   WBC 6.2   HGB 13.4   HCT 40.0        Recent Labs     01/27/23  0519 01/26/23  1034    137   K 3.5 3.6   * 115*   CO2 18* 18*   BUN 14 18   CREA 0.99 0.85   GLU 81 103*   CA 7.5* 8.4*   MG 1.9  --    PHOS 2.3*  --      Recent Labs     01/26/23  1034   AP 80   TP 7.8   ALB 3.8   GLOB 4.0   LPSE 132     No results for input(s): INR, PTP, APTT, INREXT, INREXT in the last 72 hours. No results for input(s): FE, TIBC, PSAT, FERR in the last 72 hours. No results for input(s): PH, PCO2, PO2 in the last 72 hours. No results for input(s): CPK, CKMB in the last 72 hours. No lab exists for component: TROPONINI  No components found for: [place]    CT ABD PELV WO CONT    Result Date: 1/26/2023  CT ABDOMEN AND PELVIS WITHOUT CONTRAST. 1/26/2023 12:36 PM INDICATION: Right flank pain. COMPARISON: None. TECHNIQUE: CT of the abdomen and pelvis was performed without contrast. CT dose reduction was achieved through use of a standardized protocol tailored for this examination and automatic exposure control for dose modulation. FINDINGS: Abdomen: A 2 x 3 mm calculus in the right ureterovesical junction causes mild ureterectasis, mild to moderate hydronephrosis, and renal edema. Multiple, additional, small, bilateral renal calculi are nonobstructing. The lung bases are clear. The heart is at the upper limits of normal in size. The unenhanced distal esophagus, stomach, duodenum, liver, gallbladder, pancreas, spleen, and adrenals are normal. Pelvis:  The unenhanced small bowel, ileocecal junction, appendix, colon, and bladder are normal. No free air or fluid, and no abdominopelvic lymphadenopathy. 1. Obstructing right ureterovesical junction calculus (2 x 3 mm). Mild to moderate hydronephrosis. 2. Multiple, additional, small, bilateral, nonobstructing, renal calculi. US TRANSVAGINAL    Result Date: 1/26/2023  TRANSABDOMINAL AND TRANSVAGINAL PELVIC ULTRASOUND WITH PELVIC DOPPLER. 1/26/2023 11:16 AM INDICATION: Right lower quadrant abdominal pain. COMPARISON: None. TECHNIQUE: Grayscale, color, and Doppler ultrasound imaging of the pelvis was performed both transabdominally and transvaginally. FINDINGS: Transabdominally, the pelvic organs are obscured by bowel gas. Transvaginally, the uterus measures 71 x 42 x 36 mm and shows no focal myometrial abnormality. The endometrium measures 9 mm and is of normal thickness without focal abnormality. There is a probable hemorrhagic cyst in the right ovary. The left ovary is grossly normal.  Color flow to the ovaries does not exclude torsion, however, the likelihood is very low given the greyscale appearance. The right ovary measures 43 x 38 x 31 mm and the left ovary measures 33 x 20 x 19 mm. Trace free pelvic fluid is likely physiologic. No acute process. US PELV NON OBS    Result Date: 1/26/2023  TRANSABDOMINAL AND TRANSVAGINAL PELVIC ULTRASOUND WITH PELVIC DOPPLER. 1/26/2023 11:16 AM INDICATION: Right lower quadrant abdominal pain. COMPARISON: None. TECHNIQUE: Grayscale, color, and Doppler ultrasound imaging of the pelvis was performed both transabdominally and transvaginally. FINDINGS: Transabdominally, the pelvic organs are obscured by bowel gas. Transvaginally, the uterus measures 71 x 42 x 36 mm and shows no focal myometrial abnormality. The endometrium measures 9 mm and is of normal thickness without focal abnormality. There is a probable hemorrhagic cyst in the right ovary.  The left ovary is grossly normal.  Color flow to the ovaries does not exclude torsion, however, the likelihood is very low given the greyscale appearance. The right ovary measures 43 x 38 x 31 mm and the left ovary measures 33 x 20 x 19 mm. Trace free pelvic fluid is likely physiologic. No acute process. Less than 30 minutes were spent on discharge services.     Signed:  Abdulkadir Machado MD  1/27/2023  10:09 AM

## 2023-01-27 NOTE — PROGRESS NOTES
Patient: Chase Zuniga MRN: [id number]  SSN: [id number]    YOB: 1995  Age: 24 y.o. Sex: female        ADMITTED: 2023 to Keith Nevarez MD by Brendan Young MD for Urinary tract obstruction by kidney stone [N20.0, N13.8]    Chase Zuniga is doing well. Her pain resolved overnight. Denies fevers, chills, flank or abd pain, N/V, dysuria or hematuria. She has not strained her urine. Vitals: Temp (24hrs), Av.4 °F (36.9 °C), Min:97.6 °F (36.4 °C), Max:99 °F (37.2 °C)    Blood pressure 104/69, pulse 78, temperature 98.7 °F (37.1 °C), resp. rate 17, SpO2 97 %. Intake and Output:  No intake/output data recorded. No intake/output data recorded. SOHAIL Output lats 24 hrs: No data found. SOHAIL Output last 8 hrs: No data found. BM over last 24 hrs: No data found. Physical Exam  General: NAD, pleasant  Respiratory: no distress, breathing easily, room air  Abdomen: soft, no distention; non-tender to palpation  : no CVA tenderness, voiding independently   Neuro: Appropriate, no focal neurological deficits  Skin: warm, dry  Extremities: moves all, full ROM    Labs:  CBC:   Lab Results   Component Value Date/Time    WBC 6.2 2023 10:34 AM    HCT 40.0 2023 10:34 AM    PLATELET 803 57/15/6911 10:34 AM     BMP:   Lab Results   Component Value Date/Time    Glucose 81 2023 05:19 AM    Sodium 143 2023 05:19 AM    Potassium 3.5 2023 05:19 AM    Chloride 117 (H) 2023 05:19 AM    CO2 18 (L) 2023 05:19 AM    BUN 14 2023 05:19 AM    Creatinine 0.99 2023 05:19 AM    Calcium 7.5 (L) 2023 05:19 AM        Assessment/Plan:     1.  2x3 mm Right ureteral stone  - Pain resolved overnight. She hasn't been straining her urine, unsure if stone passed. Stable for discharge home with outpt follow up with [facility]. Can continue Flomax and prn analgesics/antiemetics for a short course in the event that the stone hasn't passed.  Please provide patient with urine strainer for home. Encouraged to continue to push fluids and discussed stone prevention measures. Please call with questions.      Supervising Dr. Sabina CORDON   Signed By: Dk Moralez NP - January 27, 2023

## 2023-01-27 NOTE — PROGRESS NOTES
Has a final transfer review been performed?  Yes    Reason for Admission: Kidney stone, urinary tract obstruction  Patient comes from: home  Mental Status: Alert and oriented  ADL:self care  Ambulation:no difficulty  Pertinent Info/Safety Concerns: None  COVID Status: Not Tested or Not Indicated  MEWS Score: 1     Vitals:    01/26/23 0956 01/26/23 1847   BP: (!) 140/77 109/75   Pulse: (!) 105 84   Resp: 20 18   Temp: 97.6 °F (36.4 °C) 98.2 °F (36.8 °C)   SpO2: 97% 99%     Lines:   Peripheral IV 01/26/23 Right Wrist (Active)   Site Assessment Clean, dry, & intact 01/26/23 1150   Phlebitis Assessment 0 01/26/23 1150   Infiltration Assessment 0 01/26/23 1150   Dressing Status Clean, dry, & intact 01/26/23 1150   Dressing Type Transparent 01/26/23 1150   Hub Color/Line Status Pink;Flushed 01/26/23 1150      Transport mode:Wheelchair    Marci Rossi  being transferred to [facility](unit) for routine progression of care     \"Notification of etransfer note given to (Name) Jony German  (Title) [address] staff

## 2023-01-27 NOTE — PROGRESS NOTES
Reason for Admission:  Abdominal pain, history of asthma, kidney stones, pseudotumor cerebri and endometriosis. RUR Score:         6% Low            Plan for utilizing home health:      No needs, no history     PCP: First and Last name:       Name of Practice:    Are you a current patient: Yes/No:    Approximate date of last visit:    Can you participate in a virtual visit with your PCP:                   *Patient has a PCP in [place] but can not recall the name*  Current Advanced Directive/Advance Care Plan: Full Code      Healthcare Decision Maker: NA  Click here to complete [address] including selection of the Healthcare Decision Maker Relationship (ie \"Primary\")                             Transition of Care Plan:   CM met with patient to inform of CM role and to assess needs. Patient lives at home with her . She is normally independent with self-care and ADLs, no DME use or needs. Patient's sister Mary is at the bedside. Preferred pharmacy is [facility]. Patient is anticipating DC today and will drive herself home today if cleared by MD. There are no identified CM needs.      Todd Castro MS

## 2023-05-20 RX ORDER — AMITRIPTYLINE HYDROCHLORIDE 10 MG/1
10 TABLET, FILM COATED ORAL NIGHTLY
COMMUNITY
Start: 2022-12-30

## 2023-05-20 RX ORDER — IBUPROFEN 600 MG/1
600 TABLET ORAL EVERY 6 HOURS PRN
COMMUNITY
Start: 2023-01-27

## 2023-05-20 RX ORDER — TAMSULOSIN HYDROCHLORIDE 0.4 MG/1
0.4 CAPSULE ORAL DAILY
COMMUNITY
Start: 2023-01-28

## 2023-05-20 RX ORDER — ACETAZOLAMIDE 250 MG/1
250 TABLET ORAL 2 TIMES DAILY
COMMUNITY
Start: 2022-12-30